# Patient Record
Sex: FEMALE
[De-identification: names, ages, dates, MRNs, and addresses within clinical notes are randomized per-mention and may not be internally consistent; named-entity substitution may affect disease eponyms.]

---

## 2021-01-13 PROBLEM — Z00.00 ENCOUNTER FOR PREVENTIVE HEALTH EXAMINATION: Status: ACTIVE | Noted: 2021-01-13

## 2021-01-20 ENCOUNTER — APPOINTMENT (OUTPATIENT)
Dept: SURGERY | Facility: CLINIC | Age: 21
End: 2021-01-20
Payer: MEDICAID

## 2021-01-20 VITALS
HEART RATE: 108 BPM | WEIGHT: 169 LBS | SYSTOLIC BLOOD PRESSURE: 127 MMHG | DIASTOLIC BLOOD PRESSURE: 85 MMHG | BODY MASS INDEX: 29.95 KG/M2 | HEIGHT: 63 IN

## 2021-01-20 VITALS — TEMPERATURE: 98.4 F

## 2021-01-20 DIAGNOSIS — D73.4 CYST OF SPLEEN: ICD-10-CM

## 2021-01-20 DIAGNOSIS — Z78.9 OTHER SPECIFIED HEALTH STATUS: ICD-10-CM

## 2021-01-20 DIAGNOSIS — Z86.79 PERSONAL HISTORY OF OTHER DISEASES OF THE CIRCULATORY SYSTEM: ICD-10-CM

## 2021-01-20 PROCEDURE — 99203 OFFICE O/P NEW LOW 30 MIN: CPT

## 2021-01-20 PROCEDURE — 99072 ADDL SUPL MATRL&STAF TM PHE: CPT

## 2021-01-20 RX ORDER — MV-MIN/FOLIC/VIT K/LYCOP/COQ10 200-100MCG
CAPSULE ORAL
Refills: 0 | Status: ACTIVE | COMMUNITY

## 2021-01-20 NOTE — PHYSICAL EXAM
[Alert] : alert [Oriented to Person] : oriented to person [Oriented to Place] : oriented to place [Oriented to Time] : oriented to time [Calm] : calm [Normal Breath Sounds] : Normal breath sounds [Respiratory Effort] : normal respiratory effort [Normal Rate and Rhythm] : normal rate and rhythm [No HSM] : no hepatosplenomegaly [No Rash or Lesion] : No rash or lesion [Abdominal Masses] : No abdominal masses [Tender] : was nontender [Enlarged] : not enlarged [Purpura] : no purpura  [Petechiae] : no petechiae [de-identified] : She  is alert, well-groomed, and in NAD [de-identified] :   anicteric.  Nasal mucosa pink, septum midline. Oral mucosa pink.  Tongue midline, Pharynx without exudates.\par   [de-identified] :  Neck supple. Trachea midline. Thyroid isthmus barely palpable, lobes not felt.\par   [de-identified] : LUQ tenderness to light touch along ribcage, extending from midline, laterally.  No rebound or guarding, no palpable masses.   [de-identified] : No jaundice.

## 2021-01-20 NOTE — DATA REVIEWED
[FreeTextEntry1] : Patient Name\par KENNA GUERRERO\par Date of Birth\par 2000\par Procedure\par MR ABDOMEN with and w/o contrast\par Study Date & Time\par 2020-11-03 10:56 AM\par Patient ID\par [id number]\par Accession Number\par [id number]\par Referring Physician\par RULA RAM\par Institution Name\par [facility]\par Report\par RADIOLOGY REPORT\par FINDINGS\par FINDING\par Reason for examination : Splenic mass noted on CT\par Comparison: CT scan from [facility] dated 10/9/2020\par Contrast: 10 cc Gadovist, 605 mg/ml\par Technique:\par Prior to administration of gadolinium, the following sequences were acquired: axial T1 weighted\par gradient echo in and out of phase, axial T1 weighted 3-D fat suppressed gradient echo, axial\par inversion recovery, and axial and coronal T2 weighted Haste.\par Subsequently, gadolinium was administered intravenously and additional axial fat suppressed 3-D T1\par weighted gradient echo images were acquired in dynamic fashion and postprocessing including\par subtraction was performed. .\par Findings:\par SPLEEN: Normal in size. There is a 5.5 x 5.1 x 5 cm multiloculated T1 hypointense, T2 hyperintense\par mass in the anterosuperior aspect of the spleen. The septations. No dominant T2 hypointense. No\par significant enhancing or solid/nodular component is identified. No additional splenic masses are\par visualized.\par KIDNEYS : Without evidence of suspicious mass or hydronephrosis.\par LIVER: Normal in size without focal mass . Hepatic and portal veins are unremarkable.\par BILIARY TREE: No dilatation is present. No filling defects are identified.\par GALLBLADDER: Unremarkable.\par SPLEEN: Normal in size, without focal mass.\par PANCREAS: Without focal mass or adjacent collection. Pancreatic duct is not enlarged.\par ADRENAL GLANDS: Without focal mass.\par 1/20/2021 [employer]\par [email or web address]\par RETROPERITONEUM : Without significant adenopathy or mass.\par OSSEOUS STRUCTURES : Without focal mass.\par VASCULAR :No evidence of aneurysm.\par VISUALIZED LOWER LUNGS :Without effusions or gross mass\par OTHER :Negative.\par Impression:\par 5.5 cm multiloculated cystic splenic mass without an appreciable solid/nodular enhancing component.\par This is nonspecific in nature with primary differential considerations including splenic epithelial\par cysts, splenic lymphangioma, as well as postinfectious cysts, and posttraumatic or postinflammatory\par splenic pseudocyst. A follow-up examination in approximately 6 months is recommended to assess for\par stability.\par Electronically signed by: Zeyad Stephen MD 11/3/2020 11:43 AM\par Workstation: [id number]\par Electronically Signed By: Zafar CHILDS, Zeyad FRANCIS

## 2021-01-20 NOTE — HISTORY OF PRESENT ILLNESS
[de-identified] : Ms. KENNA GUERRERO is a 20 year  old patient who was referred by Dr. Wolf Benton with the chief complaint of having left upper quadrant and epigastric pain for 3 months.  she states that she started experiencing pain in November and went to the hospital where she  had a CT scan that showed 3 cm splenic cyst. she went to consult RULA Kessler, a surgeon who referred her for MRI abdomen. She had it done on 11/03/2020 and it showed  5.5 cm multiloculated cystic splenic mass without an appreciable solid/nodular enhancing component.   \par She states that the pain is not constant  and radiates  to the back, 5/10. She states it is exacerbated by twisting or extending/reaching with her arms.  She takes Ibuprofen and it helps.  She reports no nausea or vomiting .   Appetite is good and weight is stable.    She denies any trauma to the area.  \par  \par

## 2021-01-20 NOTE — CONSULT LETTER
[Dear  ___] : Dear  [unfilled], [Consult Letter:] : I had the pleasure of evaluating your patient, [unfilled]. [Please see my note below.] : Please see my note below. [Consult Closing:] : Thank you very much for allowing me to participate in the care of this patient.  If you have any questions, please do not hesitate to contact me. [Sincerely,] : Sincerely, [FreeTextEntry3] : Cody

## 2021-01-20 NOTE — ASSESSMENT
[FreeTextEntry1] : Impression: LUQ pain ,  5.5 cm multiloculated splenic cyst on  MRI abdomen.  \par It is unlikely that her left upper quadrant pain is related to this cyst, as on examination the pain is reproducible upon palpation of her ribcage and (by her report) the pain is exacerbated when she extends her upper body or reaches up with her arms.

## 2021-01-20 NOTE — REVIEW OF SYSTEMS
[Abdominal Pain] : abdominal pain [Negative] : Heme/Lymph [As Noted in HPI] : as noted in HPI [Fever] : no fever [Chills] : no chills [Feeling Poorly] : not feeling poorly [Feeling Tired] : not feeling tired [Recent Weight Loss (___ Lbs)] : no recent weight loss [Chest Pain] : no chest pain [Palpitations] : no palpitations [Shortness Of Breath] : no shortness of breath [Cough] : no cough [SOB on Exertion] : no shortness of breath during exertion [Vomiting] : no vomiting [Constipation] : no constipation [Diarrhea] : no diarrhea [Heartburn] : no heartburn [Melena] : no melena [Dysuria] : no dysuria [Arthralgias] : no arthralgias [Joint Pain] : no joint pain [Joint Swelling] : no joint swelling [Joint Stiffness] : no joint stiffness [Skin Lesions] : no skin lesions [Skin Wound] : no skin wound [Easy Bleeding] : no tendency for easy bleeding [Easy Bruising] : no tendency for easy bruising [Swollen Glands] : no swollen glands [Swollen Glands In The Neck] : no swollen glands in the neck

## 2021-01-20 NOTE — PLAN
[FreeTextEntry1] : Ms. GUERRERO  is presenting  today for an evaluation .   Ms. GUERRERO  was told significance of MRI and physical exam findings , options, risks and benefits were explained.   All surgical options were discussed including non-surgical treatments.  I explained to her that due to the location of the cyst, it would be likely that she would need a total splenectomy.  The risks benefits and alternatives to this procedure were explained as well, including the risk of post splenectomy sepsis and susceptibility to infections. \par She  was advised to  Repeat MRI abdomen  in May to assess stability of the  splenic Cyst and return after the tests.  Patient's questions and concerns addressed to patient's satisfaction.\par \par I also counseled the patient to seek immediate medical attention should her pain or symptoms worsen in any way.\par \par Both the patient and her mother verbalized an understanding of the information discussed and were agreeable to a follow-up MRI.\par